# Patient Record
Sex: MALE | Race: WHITE
[De-identification: names, ages, dates, MRNs, and addresses within clinical notes are randomized per-mention and may not be internally consistent; named-entity substitution may affect disease eponyms.]

---

## 2024-04-19 PROBLEM — Z00.00 ENCOUNTER FOR PREVENTIVE HEALTH EXAMINATION: Status: ACTIVE | Noted: 2024-04-19

## 2024-04-23 ENCOUNTER — APPOINTMENT (OUTPATIENT)
Dept: MRI IMAGING | Facility: CLINIC | Age: 19
End: 2024-04-23
Payer: OTHER MISCELLANEOUS

## 2024-04-23 ENCOUNTER — APPOINTMENT (OUTPATIENT)
Dept: ORTHOPEDIC SURGERY | Facility: CLINIC | Age: 19
End: 2024-04-23
Payer: OTHER MISCELLANEOUS

## 2024-04-23 DIAGNOSIS — Z78.9 OTHER SPECIFIED HEALTH STATUS: ICD-10-CM

## 2024-04-23 PROCEDURE — 72148 MRI LUMBAR SPINE W/O DYE: CPT

## 2024-04-23 PROCEDURE — 99204 OFFICE O/P NEW MOD 45 MIN: CPT

## 2024-04-23 PROCEDURE — 72100 X-RAY EXAM L-S SPINE 2/3 VWS: CPT

## 2024-04-23 RX ORDER — METHYLPREDNISOLONE 4 MG/1
4 TABLET ORAL
Qty: 1 | Refills: 0 | Status: ACTIVE | COMMUNITY
Start: 2024-04-23 | End: 1900-01-01

## 2024-04-23 NOTE — WORK
[Was the competent medical cause of the injury] : was the competent medical cause of the injury [Consistent with my objective findings] : consistent with my objective findings [Partial] : partial

## 2024-04-23 NOTE — PHYSICAL EXAM
[Flexion] : flexion [Extension] : extension [] : sensory exam non-focal throughout both lower extremities

## 2024-04-23 NOTE — HISTORY OF PRESENT ILLNESS
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  18 year old male who presents today with complaints of low back pain and tightness that developed after work related injury. Patient went to [facility] received naproxen/meloxicam, flexeri and lidocaine TDPl with minimal relief. Patient denies radicular pain, n/t, denies change in b/b function. No Hx of low back pain in past.   No PmHx, NKDA Occupation: Continues to work at light duty-at [employer].    Date of Injury/Onset: 04/16/24  Pain: At Rest: 6/10 With Activity:  9/10 Mechanism of injury:   Quality of symptoms: Sharp pain  Improves with:  Worse with: Pressure on lower back Prior treatment: [facility], Prescribed medications  Prior Imaging: No  Additional Information: None

## 2024-04-23 NOTE — IMAGING
[Facet arthropathy] : Facet arthropathy [Disc space narrowing] : Disc space narrowing [FreeTextEntry1] : coronal curvature consistent with spasm L5-s1 disc height loss

## 2024-04-23 NOTE — ASSESSMENT
[FreeTextEntry1] : 18 M with LBP and spasm after lifting injury at work light duty MRI L spine MDP Fu after MRI

## 2024-04-30 ENCOUNTER — APPOINTMENT (OUTPATIENT)
Dept: ORTHOPEDIC SURGERY | Facility: CLINIC | Age: 19
End: 2024-04-30
Payer: OTHER MISCELLANEOUS

## 2024-04-30 PROCEDURE — 99213 OFFICE O/P EST LOW 20 MIN: CPT

## 2024-04-30 RX ORDER — MELOXICAM 15 MG/1
15 TABLET ORAL
Qty: 30 | Refills: 1 | Status: ACTIVE | COMMUNITY
Start: 2024-04-30 | End: 1900-01-01

## 2024-04-30 NOTE — DATA REVIEWED
[MRI] : MRI [Lumbar Spine] : lumbar spine [I independently reviewed and interpreted images and report] : I independently reviewed and interpreted images and report [FreeTextEntry1] : mild degen changes no HNP

## 2024-04-30 NOTE — HISTORY OF PRESENT ILLNESS
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  4/30/24  pain persists .took 3 pills of MDP but had reaction with difficulty sleeping/.  18 year old male who presents today with complaints of low back pain and tightness that developed after work related injury. Patient went to [facility] received naproxen/meloxicam, flexeril and lidocaine TDPl with minimal relief. Patient denies radicular pain, n/t, denies change in b/b function. No Hx of low back pain in past.   No PmHx, NKDA Occupation: Continues to work at light duty-at [employer].    Date of Injury/Onset: 04/16/24  Pain: At Rest: 6/10 With Activity:  9/10 Mechanism of injury:   Quality of symptoms: Sharp pain  Improves with:  Worse with: Pressure on lower back Prior treatment: [facility], Prescribed medications  Prior Imaging: No  Additional Information: None

## 2024-04-30 NOTE — ASSESSMENT
[FreeTextEntry1] : 18 M with LBP and spasm after lifting injury at work MRI with mild degen changes continue with Light duty  We will also provide a prescription for anti-inflammatories.  Discussed major side effects of medication including but not limited to gastritis and acute kidney injury.  He was instructed to take with food and to discontinue use if stomach or esophageal pain developed. FU 6 weeks

## 2024-06-11 ENCOUNTER — APPOINTMENT (OUTPATIENT)
Dept: ORTHOPEDIC SURGERY | Facility: CLINIC | Age: 19
End: 2024-06-11
Payer: OTHER MISCELLANEOUS

## 2024-06-11 DIAGNOSIS — M54.50 LOW BACK PAIN, UNSPECIFIED: ICD-10-CM

## 2024-06-11 PROCEDURE — 99213 OFFICE O/P EST LOW 20 MIN: CPT

## 2024-06-11 NOTE — HISTORY OF PRESENT ILLNESS
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 6/11/24 here for fu, he continues with PT with plan to transition to HEP after this week. Patient continues to work at light duty with plan to resume full duty next week. Taking meloxicam sparingly.  4/30/24  pain persists .took 3 pills of MDP but had reaction with difficulty sleeping/.  18 year old male who presents today with complaints of low back pain and tightness that developed after work related injury. Patient went to [facility] received naproxen/meloxicam, flexeril and lidocaine TDPl with minimal relief. Patient denies radicular pain, n/t, denies change in b/b function. No Hx of low back pain in past.   No PmHx, NKDA Occupation: Continues to work at light duty-at [employer].    Date of Injury/Onset: 04/16/24  Pain: At Rest: 6/10 With Activity:  9/10 Mechanism of injury:   Quality of symptoms: Sharp pain  Improves with:  Worse with: Pressure on lower back Prior treatment: [facility], Prescribed medications  Prior Imaging: No  Additional Information: None

## 2024-06-11 NOTE — RETURN TO WORK/SCHOOL
[Return Date: _____] : as of [unfilled].  This has been discussed in detail with ~Rick~ and ~he/she~ understands this. [Full Duty] : full duty [FreeTextEntry1] : Patient evaluated in the office of Dr. Mendoza on 6/11/24. Patient cleared to return to full duty on 6/18/24,

## 2024-06-11 NOTE — ASSESSMENT
[FreeTextEntry1] : 18 M with LBP and spasm after lifting injury at work MRI with mild degen changes on transitional duty. RTW no restrictions 6/18/24 c/w PT   We will also provide a prescription for anti-inflammatories.  Discussed major side effects of medication including but not limited to gastritis and acute kidney injury.  He was instructed to take with food and to discontinue use if stomach or esophageal pain developed. FU 6 weeks

## 2024-07-23 ENCOUNTER — APPOINTMENT (OUTPATIENT)
Dept: ORTHOPEDIC SURGERY | Facility: CLINIC | Age: 19
End: 2024-07-23
Payer: OTHER MISCELLANEOUS

## 2024-07-23 DIAGNOSIS — M54.50 LOW BACK PAIN, UNSPECIFIED: ICD-10-CM

## 2024-07-23 PROCEDURE — 99213 OFFICE O/P EST LOW 20 MIN: CPT

## 2024-07-23 NOTE — HISTORY OF PRESENT ILLNESS
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  7/23/24Pain improved with PT.  Working fully duty   6/11/24 here for fu, he continues with PT with plan to transition to HEP after this week. Patient continues to work at light duty with plan to resume full duty next week. Taking meloxicam sparingly.  4/30/24  pain persists .took 3 pills of MDP but had reaction with difficulty sleeping/.  18 year old male who presents today with complaints of low back pain and tightness that developed after work related injury. Patient went to [facility] received naproxen/meloxicam, flexeril and lidocaine TDPl with minimal relief. Patient denies radicular pain, n/t, denies change in b/b function. No Hx of low back pain in past.   No PmHx, NKDA Occupation: Continues to work at light duty-at [employer].    Date of Injury/Onset: 04/16/24  Pain: At Rest: 6/10 With Activity:  9/10 Mechanism of injury:   Quality of symptoms: Sharp pain  Improves with:  Worse with: Pressure on lower back Prior treatment: [facility], Prescribed medications  Prior Imaging: No  Additional Information: None

## 2024-07-23 NOTE — ASSESSMENT
[FreeTextEntry1] : 18 M with LBP and spasm after lifting injury at work MRI with mild degen changes Returned to work full duty.  Doing well C/w PT

## 2024-07-23 NOTE — WORK
[Was the competent medical cause of the injury] : was the competent medical cause of the injury [Consistent with my objective findings] : consistent with my objective findings

## 2024-09-03 ENCOUNTER — APPOINTMENT (OUTPATIENT)
Dept: ORTHOPEDIC SURGERY | Facility: CLINIC | Age: 19
End: 2024-09-03

## 2024-12-10 ENCOUNTER — APPOINTMENT (OUTPATIENT)
Dept: ORTHOPEDIC SURGERY | Facility: CLINIC | Age: 19
End: 2024-12-10
Payer: OTHER MISCELLANEOUS

## 2024-12-10 DIAGNOSIS — M54.50 LOW BACK PAIN, UNSPECIFIED: ICD-10-CM

## 2024-12-10 PROCEDURE — 99213 OFFICE O/P EST LOW 20 MIN: CPT

## 2024-12-10 PROCEDURE — 72100 X-RAY EXAM L-S SPINE 2/3 VWS: CPT

## 2024-12-10 RX ORDER — MELOXICAM 15 MG/1
15 TABLET ORAL DAILY
Qty: 28 | Refills: 1 | Status: ACTIVE | COMMUNITY
Start: 2024-12-10 | End: 1900-01-01

## 2025-01-21 ENCOUNTER — APPOINTMENT (OUTPATIENT)
Dept: ORTHOPEDIC SURGERY | Facility: CLINIC | Age: 20
End: 2025-01-21

## 2025-04-16 ENCOUNTER — APPOINTMENT (OUTPATIENT)
Dept: ORTHOPEDIC SURGERY | Facility: CLINIC | Age: 20
End: 2025-04-16
Payer: COMMERCIAL

## 2025-04-16 VITALS — HEIGHT: 69 IN | BODY MASS INDEX: 20.73 KG/M2 | WEIGHT: 140 LBS

## 2025-04-16 DIAGNOSIS — M25.561 PAIN IN RIGHT KNEE: ICD-10-CM

## 2025-04-16 DIAGNOSIS — S89.91XA UNSPECIFIED INJURY OF RIGHT LOWER LEG, INITIAL ENCOUNTER: ICD-10-CM

## 2025-04-16 DIAGNOSIS — G89.29 PAIN IN RIGHT KNEE: ICD-10-CM

## 2025-04-16 DIAGNOSIS — M23.91 UNSPECIFIED INTERNAL DERANGEMENT OF RIGHT KNEE: ICD-10-CM

## 2025-04-16 DIAGNOSIS — Z78.9 OTHER SPECIFIED HEALTH STATUS: ICD-10-CM

## 2025-04-16 DIAGNOSIS — M79.18 MYALGIA, OTHER SITE: ICD-10-CM

## 2025-04-16 PROCEDURE — 73564 X-RAY EXAM KNEE 4 OR MORE: CPT | Mod: RT

## 2025-04-16 PROCEDURE — 99214 OFFICE O/P EST MOD 30 MIN: CPT

## 2025-04-22 ENCOUNTER — APPOINTMENT (OUTPATIENT)
Dept: MRI IMAGING | Facility: CLINIC | Age: 20
End: 2025-04-22

## 2025-04-28 ENCOUNTER — APPOINTMENT (OUTPATIENT)
Dept: ORTHOPEDIC SURGERY | Facility: CLINIC | Age: 20
End: 2025-04-28

## 2025-05-05 ENCOUNTER — APPOINTMENT (OUTPATIENT)
Dept: MRI IMAGING | Facility: CLINIC | Age: 20
End: 2025-05-05
Payer: COMMERCIAL

## 2025-05-05 PROCEDURE — 73721 MRI JNT OF LWR EXTRE W/O DYE: CPT | Mod: RT

## 2025-05-12 ENCOUNTER — APPOINTMENT (OUTPATIENT)
Dept: ORTHOPEDIC SURGERY | Facility: CLINIC | Age: 20
End: 2025-05-12

## 2025-05-21 ENCOUNTER — APPOINTMENT (OUTPATIENT)
Dept: ORTHOPEDIC SURGERY | Facility: CLINIC | Age: 20
End: 2025-05-21
Payer: COMMERCIAL

## 2025-05-21 DIAGNOSIS — S89.91XA UNSPECIFIED INJURY OF RIGHT LOWER LEG, INITIAL ENCOUNTER: ICD-10-CM

## 2025-05-21 DIAGNOSIS — M25.561 PAIN IN RIGHT KNEE: ICD-10-CM

## 2025-05-21 DIAGNOSIS — G89.29 PAIN IN RIGHT KNEE: ICD-10-CM

## 2025-05-21 DIAGNOSIS — M79.18 MYALGIA, OTHER SITE: ICD-10-CM

## 2025-05-21 DIAGNOSIS — M23.91 UNSPECIFIED INTERNAL DERANGEMENT OF RIGHT KNEE: ICD-10-CM

## 2025-05-21 PROCEDURE — 99214 OFFICE O/P EST MOD 30 MIN: CPT

## 2025-08-06 ENCOUNTER — APPOINTMENT (OUTPATIENT)
Dept: ORTHOPEDIC SURGERY | Facility: CLINIC | Age: 20
End: 2025-08-06
Payer: COMMERCIAL

## 2025-08-06 VITALS — BODY MASS INDEX: 20.73 KG/M2 | WEIGHT: 140 LBS | HEIGHT: 69 IN

## 2025-08-06 DIAGNOSIS — M23.006 CYSTIC MENISCUS, UNSPECIFIED MENISCUS, RIGHT KNEE: ICD-10-CM

## 2025-08-06 DIAGNOSIS — M79.18 MYALGIA, OTHER SITE: ICD-10-CM

## 2025-08-06 DIAGNOSIS — S83.211D BUCKET-HANDLE TEAR OF MEDIAL MENISCUS, CURRENT INJURY, RIGHT KNEE, SUBSEQUENT ENCOUNTER: ICD-10-CM

## 2025-08-06 DIAGNOSIS — G89.29 PAIN IN RIGHT KNEE: ICD-10-CM

## 2025-08-06 DIAGNOSIS — M25.561 PAIN IN RIGHT KNEE: ICD-10-CM

## 2025-08-06 DIAGNOSIS — S89.91XA UNSPECIFIED INJURY OF RIGHT LOWER LEG, INITIAL ENCOUNTER: ICD-10-CM

## 2025-08-06 DIAGNOSIS — S83.219S: ICD-10-CM

## 2025-08-06 DIAGNOSIS — M23.91 UNSPECIFIED INTERNAL DERANGEMENT OF RIGHT KNEE: ICD-10-CM

## 2025-08-06 DIAGNOSIS — M65.961 UNSPECIFIED SYNOVITIS AND TENOSYNOVITIS, RIGHT LOWER LEG: ICD-10-CM

## 2025-08-06 DIAGNOSIS — S83.281D OTHER TEAR OF LATERAL MENISCUS, CURRENT INJURY, RIGHT KNEE, SUBSEQUENT ENCOUNTER: ICD-10-CM

## 2025-08-06 DIAGNOSIS — S83.211A BUCKET-HANDLE TEAR OF MEDIAL MENISCUS, CURRENT INJURY, RIGHT KNEE, INITIAL ENCOUNTER: ICD-10-CM

## 2025-08-06 PROCEDURE — 99214 OFFICE O/P EST MOD 30 MIN: CPT

## 2025-08-07 PROBLEM — S83.211A BUCKET HANDLE TEAR OF MEDIAL MENISCUS OF RIGHT KNEE: Status: ACTIVE | Noted: 2025-08-07

## 2025-08-07 PROBLEM — S83.219S: Status: ACTIVE | Noted: 2025-08-07

## 2025-08-07 PROBLEM — M23.006 CYST OF MENISCUS OF RIGHT KNEE: Status: ACTIVE | Noted: 2025-08-07

## 2025-08-07 PROBLEM — S83.281D ACUTE LATERAL MENISCUS TEAR OF RIGHT KNEE, SUBSEQUENT ENCOUNTER: Status: ACTIVE | Noted: 2025-08-07

## 2025-08-07 PROBLEM — S83.211D BUCKET-HANDLE TEAR OF MEDIAL MENISCUS OF RIGHT KNEE AS CURRENT INJURY, SUBSEQUENT ENCOUNTER: Status: ACTIVE | Noted: 2025-08-07

## 2025-08-07 PROBLEM — M65.961 SYNOVITIS OF RIGHT KNEE: Status: ACTIVE | Noted: 2025-08-07

## 2025-08-20 ENCOUNTER — NON-APPOINTMENT (OUTPATIENT)
Age: 20
End: 2025-08-20

## 2025-09-10 ENCOUNTER — NON-APPOINTMENT (OUTPATIENT)
Age: 20
End: 2025-09-10

## 2025-09-11 ENCOUNTER — NON-APPOINTMENT (OUTPATIENT)
Age: 20
End: 2025-09-11

## 2025-09-19 ENCOUNTER — APPOINTMENT (OUTPATIENT)
Dept: ORTHOPEDIC SURGERY | Facility: HOSPITAL | Age: 20
End: 2025-09-19

## 2025-09-19 RX ORDER — IBUPROFEN 800 MG/1
800 TABLET, FILM COATED ORAL 3 TIMES DAILY
Qty: 90 | Refills: 2 | Status: ACTIVE | COMMUNITY
Start: 2025-09-19 | End: 1900-01-01

## 2025-09-19 RX ORDER — OXYCODONE AND ACETAMINOPHEN 5; 325 MG/1; MG/1
5-325 TABLET ORAL
Qty: 30 | Refills: 0 | Status: ACTIVE | COMMUNITY
Start: 2025-09-19 | End: 1900-01-01

## 2025-09-19 RX ORDER — ONDANSETRON 4 MG/1
4 TABLET, ORALLY DISINTEGRATING ORAL
Qty: 15 | Refills: 0 | Status: ACTIVE | COMMUNITY
Start: 2025-09-19 | End: 1900-01-01

## 2025-09-19 RX ORDER — DOCUSATE SODIUM 50 MG/1
50 CAPSULE, LIQUID FILLED ORAL TWICE DAILY
Qty: 15 | Refills: 0 | Status: ACTIVE | COMMUNITY
Start: 2025-09-19 | End: 1900-01-01